# Patient Record
Sex: FEMALE | Race: WHITE | NOT HISPANIC OR LATINO
[De-identification: names, ages, dates, MRNs, and addresses within clinical notes are randomized per-mention and may not be internally consistent; named-entity substitution may affect disease eponyms.]

---

## 2022-10-17 PROBLEM — Z00.00 ENCOUNTER FOR PREVENTIVE HEALTH EXAMINATION: Status: ACTIVE | Noted: 2022-10-17

## 2022-11-22 ENCOUNTER — APPOINTMENT (OUTPATIENT)
Dept: OBGYN | Facility: CLINIC | Age: 77
End: 2022-11-22

## 2023-03-28 ENCOUNTER — APPOINTMENT (OUTPATIENT)
Dept: ENDOCRINOLOGY | Facility: CLINIC | Age: 78
End: 2023-03-28
Payer: MEDICARE

## 2023-03-28 ENCOUNTER — RESULT REVIEW (OUTPATIENT)
Age: 78
End: 2023-03-28

## 2023-03-28 VITALS
HEART RATE: 70 BPM | OXYGEN SATURATION: 98 % | HEIGHT: 60 IN | SYSTOLIC BLOOD PRESSURE: 100 MMHG | BODY MASS INDEX: 20.22 KG/M2 | WEIGHT: 103 LBS | DIASTOLIC BLOOD PRESSURE: 58 MMHG

## 2023-03-28 DIAGNOSIS — E53.8 DEFICIENCY OF OTHER SPECIFIED B GROUP VITAMINS: ICD-10-CM

## 2023-03-28 DIAGNOSIS — E55.9 VITAMIN D DEFICIENCY, UNSPECIFIED: ICD-10-CM

## 2023-03-28 DIAGNOSIS — E34.9 ENDOCRINE DISORDER, UNSPECIFIED: ICD-10-CM

## 2023-03-28 DIAGNOSIS — R79.89 OTHER SPECIFIED ABNORMAL FINDINGS OF BLOOD CHEMISTRY: ICD-10-CM

## 2023-03-28 PROCEDURE — 36415 COLL VENOUS BLD VENIPUNCTURE: CPT

## 2023-03-28 PROCEDURE — 99203 OFFICE O/P NEW LOW 30 MIN: CPT | Mod: 25

## 2023-03-29 LAB
25(OH)D3 SERPL-MCNC: 70.7 NG/ML
ANION GAP SERPL CALC-SCNC: 11 MMOL/L
BASOPHILS # BLD AUTO: 0.05 K/UL
BASOPHILS NFR BLD AUTO: 0.8 %
BUN SERPL-MCNC: 45 MG/DL
CALCIUM SERPL-MCNC: 9.9 MG/DL
CALCIUM SERPL-MCNC: 9.9 MG/DL
CHLORIDE SERPL-SCNC: 104 MMOL/L
CO2 SERPL-SCNC: 25 MMOL/L
CREAT SERPL-MCNC: 0.85 MG/DL
EGFR: 71 ML/MIN/1.73M2
EOSINOPHIL # BLD AUTO: 0.01 K/UL
EOSINOPHIL NFR BLD AUTO: 0.2 %
GLUCOSE SERPL-MCNC: 91 MG/DL
HCT VFR BLD CALC: 35.2 %
HGB BLD-MCNC: 11.1 G/DL
IMM GRANULOCYTES NFR BLD AUTO: 0.3 %
LYMPHOCYTES # BLD AUTO: 2.06 K/UL
LYMPHOCYTES NFR BLD AUTO: 33.7 %
MAN DIFF?: NORMAL
MCHC RBC-ENTMCNC: 30.7 PG
MCHC RBC-ENTMCNC: 31.5 GM/DL
MCV RBC AUTO: 97.5 FL
MONOCYTES # BLD AUTO: 0.55 K/UL
MONOCYTES NFR BLD AUTO: 9 %
NEUTROPHILS # BLD AUTO: 3.43 K/UL
NEUTROPHILS NFR BLD AUTO: 56 %
PARATHYROID HORMONE INTACT: 17 PG/ML
PHOSPHATE SERPL-MCNC: 3.7 MG/DL
PLATELET # BLD AUTO: 238 K/UL
POTASSIUM SERPL-SCNC: 4.8 MMOL/L
RBC # BLD: 3.61 M/UL
RBC # FLD: 14.9 %
SODIUM SERPL-SCNC: 140 MMOL/L
T4 SERPL-MCNC: 7.5 UG/DL
TSH SERPL-ACNC: 2.67 UIU/ML
VIT B12 SERPL-MCNC: 1124 PG/ML
WBC # FLD AUTO: 6.12 K/UL

## 2023-03-31 LAB
ALBUMIN MFR SERPL ELPH: 60.5 %
ALBUMIN SERPL-MCNC: 4.2 G/DL
ALBUMIN/GLOB SERPL: 1.5 RATIO
ALPHA1 GLOB MFR SERPL ELPH: 3.8 %
ALPHA1 GLOB SERPL ELPH-MCNC: 0.3 G/DL
ALPHA2 GLOB MFR SERPL ELPH: 10 %
ALPHA2 GLOB SERPL ELPH-MCNC: 0.7 G/DL
B-GLOBULIN MFR SERPL ELPH: 13.1 %
B-GLOBULIN SERPL ELPH-MCNC: 0.9 G/DL
CA-I SERPL-SCNC: 5.2 MG/DL
GAMMA GLOB FLD ELPH-MCNC: 0.9 G/DL
GAMMA GLOB MFR SERPL ELPH: 12.6 %
GLIADIN IGA SER QL: 5.3 UNITS
GLIADIN IGG SER QL: <5 UNITS
GLIADIN PEPTIDE IGA SER-ACNC: NEGATIVE
GLIADIN PEPTIDE IGG SER-ACNC: NEGATIVE
INTERPRETATION SERPL IEP-IMP: NORMAL
M PROTEIN SPEC IFE-MCNC: NORMAL
PROT SERPL-MCNC: 7 G/DL
PROT SERPL-MCNC: 7 G/DL
TTG IGA SER IA-ACNC: <1.2 U/ML
TTG IGA SER-ACNC: NEGATIVE
TTG IGG SER IA-ACNC: <1.2 U/ML
TTG IGG SER IA-ACNC: NEGATIVE

## 2023-04-02 LAB — OSTEOCALCIN SERPL-MCNC: 12.9 NG/ML

## 2023-04-05 LAB — COLLAGEN CTX SERPL-MCNC: 239 PG/ML

## 2023-04-18 NOTE — HISTORY OF PRESENT ILLNESS
[FreeTextEntry1] : Mar 28, 2023\par \par Derm:  Dr. Lorna Pandya at [address]\par             Dr. Katy Ruiz in [place]\par            Derm:  Dr. Brand at [facility]\par            Eyes:   Dr. Garza  \par \par CC:  Bone density:   T scores:  LS -2.3  (L4 -2.6)     TH -2.9;  FN -2.5 (ordered by Marco Cristina  10/20/22)\par \par 79 yo with osteoporosis based on bone density.\par No history of fractures.\par Smoker.\par Little alcohol.\par Mother broke her leg walking the dog\par Takes vitamin D 1000 iu daily plus\par calcium 200 mg + 500 iu vitamin D.\par \par Two falls, no fractures.  \par \par \par Labs at [facility] 2/10/16 unrmearkable \par \par Bone density at [facility] 10/20/22  T scores:  LS -2.3  (L4 -2.6)     TH -2.9;  FN -2.5\par \par \par : :\par Constitutional:  Alert, well nourished, healthy appearance, no acute distress \par Eyes:  No proptosis, no stare\par Thyroid:\par Pulmonary:  No respiratory distress, no accessory muscle use; normal rate and effort\par Cardiac:  Normal rate\par Vascular: \par Endocrine:  No stigmata of Cushing’s Syndrome\par Musculoskeletal:  Normal gait, no involuntary movements\par Neurology:  No tremors\par Affect/Mood/Psych:  Oriented x 3; normal affect, normal insight/judgement, normal mood \par .\par \par Impression:    T scores at or below -2.5 indicate further evaluation to be considered priot to pharmacologic considerations.

## 2023-04-28 ENCOUNTER — APPOINTMENT (OUTPATIENT)
Dept: ENDOCRINOLOGY | Facility: CLINIC | Age: 78
End: 2023-04-28
Payer: MEDICARE

## 2023-04-28 VITALS
DIASTOLIC BLOOD PRESSURE: 78 MMHG | WEIGHT: 103 LBS | HEART RATE: 60 BPM | SYSTOLIC BLOOD PRESSURE: 106 MMHG | HEIGHT: 60 IN | OXYGEN SATURATION: 98 % | BODY MASS INDEX: 20.22 KG/M2

## 2023-04-28 PROCEDURE — 99214 OFFICE O/P EST MOD 30 MIN: CPT

## 2023-05-02 NOTE — HISTORY OF PRESENT ILLNESS
[FreeTextEntry1] : Apr 28, 2023    in person\par \par Derm:  Dr. Lorna Pandya at [address]\par             Dr. Katy Ruiz in [place]\par            Derm:  Dr. Brand at [facility]\par            Eyes:   Dr. Garza  \par \par CC:  Bone density:   T scores:  LS -2.3  (L4 -2.6*)     TH -2.9*;  FN -2.5 (ordered by Marco Cristina  10/20/22)\par             -BMI  20.12  5 ft 103 lbs\par \par 79 yo with osteoporosis based on bone density.\par No history of fractures.\par Ex-smoker.  \par Little alcohol.\par Mother broke her leg walking the dog\par Takes vitamin D 1000 iu daily plus\par calcium 200 mg + 500 iu vitamin D.\par \par Two falls, no fractures.  \par \par : :\par Constitutional:  Alert, well nourished, healthy appearance, no acute distress \par Eyes:  No proptosis, no stare\par Thyroid:\par Pulmonary:  No respiratory distress, no accessory muscle use; normal rate and effort\par Cardiac:  Normal rate\par Vascular: \par Endocrine:  No stigmata of Cushing’s Syndrome\par Musculoskeletal:  Normal gait, no involuntary movements\par Neurology:  No tremors\par Affect/Mood/Psych:  Oriented x 3; normal affect, normal insight/judgement, normal mood \par .\par \par Impression:  TH T score of -2.9 is significant.    \par \par Plan:  Inc calcium 200 mg to BID w/ meal\par She is interested in Prolia.\par It would  be helpful for bone density if she could increase BMI as well.       \par \par \par Mar 28, 2023\par \par Derm:  Dr. Lorna Pandya at [address]\par             Dr. Katy Ruiz in [place]\par            Derm:  Dr. Brand at [facility]\par            Eyes:   Dr. Garza  \par \par CC:  Bone density:   T scores:  LS -2.3  (L4 -2.6)     TH -2.9;  FN -2.5 (ordered by Marco Cristina  10/20/22)\par \par 79 yo with osteoporosis based on bone density.\par No history of fractures.\par Smoker.\par Little alcohol.\par Mother broke her leg walking the dog\par Takes vitamin D 1000 iu daily plus\par calcium 200 mg + 500 iu vitamin D.\par \par Two falls, no fractures.  \par \par \par Labs at [facility] 2/10/16 unrmearkable \par \par Bone density at [facility] 10/20/22  T scores:  LS -2.3  (L4 -2.6)     TH -2.9;  FN -2.5\par \par \par : :\par Constitutional:  Alert, well nourished, healthy appearance, no acute distress \par Eyes:  No proptosis, no stare\par Thyroid:\par Pulmonary:  No respiratory distress, no accessory muscle use; normal rate and effort\par Cardiac:  Normal rate\par Vascular: \par Endocrine:  No stigmata of Cushing’s Syndrome\par Musculoskeletal:  Normal gait, no involuntary movements\par Neurology:  No tremors\par Affect/Mood/Psych:  Oriented x 3; normal affect, normal insight/judgement, normal mood \par .\par \par Impression:    T scores at or below -2.5 indicate further evaluation to be considered priot to pharmacologic considerations.

## 2023-05-12 ENCOUNTER — TRANSCRIPTION ENCOUNTER (OUTPATIENT)
Age: 78
End: 2023-05-12

## 2023-07-20 ENCOUNTER — APPOINTMENT (OUTPATIENT)
Dept: PAIN MANAGEMENT | Facility: CLINIC | Age: 78
End: 2023-07-20
Payer: MEDICARE

## 2023-07-20 ENCOUNTER — RESULT REVIEW (OUTPATIENT)
Age: 78
End: 2023-07-20

## 2023-07-20 VITALS
HEART RATE: 73 BPM | OXYGEN SATURATION: 98 % | DIASTOLIC BLOOD PRESSURE: 78 MMHG | RESPIRATION RATE: 18 BRPM | HEIGHT: 60 IN | SYSTOLIC BLOOD PRESSURE: 123 MMHG | WEIGHT: 104 LBS | BODY MASS INDEX: 20.42 KG/M2

## 2023-07-20 DIAGNOSIS — F31.70 BIPOLAR DISORDER, CURRENTLY IN REMISSION, MOST RECENT EPISODE UNSPECIFIED: ICD-10-CM

## 2023-07-20 DIAGNOSIS — M17.12 UNILATERAL PRIMARY OSTEOARTHRITIS, LEFT KNEE: ICD-10-CM

## 2023-07-20 DIAGNOSIS — Z85.51 PERSONAL HISTORY OF MALIGNANT NEOPLASM OF BLADDER: ICD-10-CM

## 2023-07-20 DIAGNOSIS — Z78.9 OTHER SPECIFIED HEALTH STATUS: ICD-10-CM

## 2023-07-20 DIAGNOSIS — Z87.39 PERSONAL HISTORY OF OTHER DISEASES OF THE MUSCULOSKELETAL SYSTEM AND CONNECTIVE TISSUE: ICD-10-CM

## 2023-07-20 DIAGNOSIS — S89.92XS UNSPECIFIED INJURY OF LEFT LOWER LEG, SEQUELA: ICD-10-CM

## 2023-07-20 PROCEDURE — 99203 OFFICE O/P NEW LOW 30 MIN: CPT

## 2023-07-20 RX ORDER — BIOTIN 10 MG
TABLET ORAL
Refills: 0 | Status: ACTIVE | COMMUNITY

## 2023-07-20 RX ORDER — UBIDECARENONE/VIT E ACET 100MG-5
CAPSULE ORAL
Refills: 0 | Status: ACTIVE | COMMUNITY

## 2023-07-20 NOTE — PHYSICAL EXAM

## 2023-07-20 NOTE — ASSESSMENT
[FreeTextEntry1] : 78 yof w/ left knee clicking.\par \par Xrays of left knee.\par \par Physical therapy prescribed - goal will be to increase ROM, strengthening, postural training, other modalities ad massimo which may include massage and stim. Goals of therapy discussed with the patient in detail and will be discussed with physical therapist. Patient will follow-up following course of physical therapy to monitor progress and adjust therapy as needed.\par \par Acetaminophen 1,000 mg q8h prn for moderate pain. Risks, benefits, and alternatives of acetaminophen discussed with patient.\par \par Ibuprofen 600 mg q8h prn add when pain is not adequately controlled with acetaminophen. Risks, benefits, and alternatives of ibuprofen discussed with patient.\par \par Diet and nutritional strategies discussed which may improve patients pain and will improve overall health.\par \par RTC prn.

## 2023-07-20 NOTE — HISTORY OF PRESENT ILLNESS
[Joint Pain] : joint  [Constant] : constant [1] : a minimum pain level of 1/10 [6] : a maximum pain level of 6/10 [Dull] : dull [Aching] : aching [Standing] : standing [Walking] : walking [Rest] : rest [FreeTextEntry1] : 78 yof presents w/ left knee clicking. Pain began after she fell in front of [employer] on 12/30/20. She reports suing after this successfully. She then fell again in May of 2023 in [facility]. She reports clicking and cracking in her knee, no pain. Mild balance issues. Quality of life is impaired. There has been a severe exacerbation of the patient's chronic pain. PT has helped her before.

## 2023-09-24 ENCOUNTER — TRANSCRIPTION ENCOUNTER (OUTPATIENT)
Age: 78
End: 2023-09-24

## 2023-10-03 ENCOUNTER — TRANSCRIPTION ENCOUNTER (OUTPATIENT)
Age: 78
End: 2023-10-03

## 2023-10-11 ENCOUNTER — TRANSCRIPTION ENCOUNTER (OUTPATIENT)
Age: 78
End: 2023-10-11

## 2023-10-11 ENCOUNTER — APPOINTMENT (OUTPATIENT)
Dept: ENDOCRINOLOGY | Facility: CLINIC | Age: 78
End: 2023-10-11
Payer: MEDICARE

## 2023-10-11 PROCEDURE — 99214 OFFICE O/P EST MOD 30 MIN: CPT | Mod: 95

## 2023-10-12 ENCOUNTER — TRANSCRIPTION ENCOUNTER (OUTPATIENT)
Age: 78
End: 2023-10-12

## 2023-10-13 ENCOUNTER — APPOINTMENT (OUTPATIENT)
Dept: GERIATRICS | Facility: CLINIC | Age: 78
End: 2023-10-13
Payer: MEDICARE

## 2023-10-13 VITALS
DIASTOLIC BLOOD PRESSURE: 60 MMHG | TEMPERATURE: 97.8 F | OXYGEN SATURATION: 97 % | HEART RATE: 73 BPM | SYSTOLIC BLOOD PRESSURE: 116 MMHG | WEIGHT: 107.6 LBS | BODY MASS INDEX: 21.01 KG/M2

## 2023-10-13 DIAGNOSIS — Z13.220 ENCOUNTER FOR SCREENING FOR LIPOID DISORDERS: ICD-10-CM

## 2023-10-13 DIAGNOSIS — Z23 ENCOUNTER FOR IMMUNIZATION: ICD-10-CM

## 2023-10-13 DIAGNOSIS — Z71.89 OTHER SPECIFIED COUNSELING: ICD-10-CM

## 2023-10-13 DIAGNOSIS — Z11.59 ENCOUNTER FOR SCREENING FOR OTHER VIRAL DISEASES: ICD-10-CM

## 2023-10-13 DIAGNOSIS — L98.9 DISORDER OF THE SKIN AND SUBCUTANEOUS TISSUE, UNSPECIFIED: ICD-10-CM

## 2023-10-13 PROCEDURE — 99205 OFFICE O/P NEW HI 60 MIN: CPT

## 2023-10-13 PROCEDURE — G0444 DEPRESSION SCREEN ANNUAL: CPT | Mod: 59

## 2023-10-13 RX ORDER — DENOSUMAB 60 MG/ML
60 INJECTION SUBCUTANEOUS
Refills: 0 | Status: ACTIVE | COMMUNITY
Start: 2023-10-13

## 2023-10-16 DIAGNOSIS — Z91.89 OTHER SPECIFIED PERSONAL RISK FACTORS, NOT ELSEWHERE CLASSIFIED: ICD-10-CM

## 2023-10-16 LAB
CHOLEST SERPL-MCNC: 234 MG/DL
HCV AB SER QL: NONREACTIVE
HCV S/CO RATIO: 0.08 S/CO
HDLC SERPL-MCNC: 89 MG/DL
LDLC SERPL CALC-MCNC: 137 MG/DL
NONHDLC SERPL-MCNC: 145 MG/DL
TRIGL SERPL-MCNC: 46 MG/DL

## 2023-10-21 ENCOUNTER — RESULT REVIEW (OUTPATIENT)
Age: 78
End: 2023-10-21

## 2023-11-01 ENCOUNTER — APPOINTMENT (OUTPATIENT)
Dept: OBGYN | Facility: CLINIC | Age: 78
End: 2023-11-01

## 2023-11-02 ENCOUNTER — APPOINTMENT (OUTPATIENT)
Dept: OBGYN | Facility: CLINIC | Age: 78
End: 2023-11-02

## 2023-11-07 ENCOUNTER — NON-APPOINTMENT (OUTPATIENT)
Age: 78
End: 2023-11-07

## 2023-11-07 ENCOUNTER — APPOINTMENT (OUTPATIENT)
Dept: OBGYN | Facility: CLINIC | Age: 78
End: 2023-11-07
Payer: MEDICARE

## 2023-11-07 ENCOUNTER — RESULT REVIEW (OUTPATIENT)
Age: 78
End: 2023-11-07

## 2023-11-07 VITALS
DIASTOLIC BLOOD PRESSURE: 60 MMHG | SYSTOLIC BLOOD PRESSURE: 118 MMHG | WEIGHT: 103 LBS | HEIGHT: 60 IN | BODY MASS INDEX: 20.22 KG/M2

## 2023-11-07 DIAGNOSIS — Z01.419 ENCOUNTER FOR GYNECOLOGICAL EXAMINATION (GENERAL) (ROUTINE) W/OUT ABNORMAL FINDINGS: ICD-10-CM

## 2023-11-07 PROCEDURE — G0101: CPT

## 2023-11-13 ENCOUNTER — NON-APPOINTMENT (OUTPATIENT)
Age: 78
End: 2023-11-13

## 2023-11-13 ENCOUNTER — APPOINTMENT (OUTPATIENT)
Dept: CARDIOLOGY | Facility: CLINIC | Age: 78
End: 2023-11-13
Payer: MEDICARE

## 2023-11-13 VITALS
BODY MASS INDEX: 20.12 KG/M2 | RESPIRATION RATE: 14 BRPM | SYSTOLIC BLOOD PRESSURE: 120 MMHG | DIASTOLIC BLOOD PRESSURE: 76 MMHG | WEIGHT: 103 LBS | HEART RATE: 70 BPM | OXYGEN SATURATION: 97 %

## 2023-11-13 DIAGNOSIS — E78.2 MIXED HYPERLIPIDEMIA: ICD-10-CM

## 2023-11-13 PROCEDURE — 99204 OFFICE O/P NEW MOD 45 MIN: CPT | Mod: 25

## 2023-11-13 PROCEDURE — 93000 ELECTROCARDIOGRAM COMPLETE: CPT

## 2023-11-13 RX ORDER — DIVALPROEX SODIUM 500 1/1
500 TABLET, EXTENDED RELEASE ORAL
Refills: 0 | Status: ACTIVE | COMMUNITY

## 2023-11-14 ENCOUNTER — NON-APPOINTMENT (OUTPATIENT)
Age: 78
End: 2023-11-14

## 2023-11-27 ENCOUNTER — LABORATORY RESULT (OUTPATIENT)
Age: 78
End: 2023-11-27

## 2023-11-28 LAB
CYTOLOGY CVX/VAG DOC THIN PREP: ABNORMAL
HPV HIGH+LOW RISK DNA PNL CVX: NOT DETECTED

## 2024-01-19 ENCOUNTER — APPOINTMENT (OUTPATIENT)
Dept: GERIATRICS | Facility: CLINIC | Age: 79
End: 2024-01-19
Payer: MEDICARE

## 2024-01-19 PROCEDURE — 36415 COLL VENOUS BLD VENIPUNCTURE: CPT

## 2024-01-22 LAB
CHOLEST SERPL-MCNC: 173 MG/DL
HDLC SERPL-MCNC: 88 MG/DL
LDLC SERPL CALC-MCNC: 76 MG/DL
NONHDLC SERPL-MCNC: 85 MG/DL
TRIGL SERPL-MCNC: 42 MG/DL

## 2024-01-31 ENCOUNTER — APPOINTMENT (OUTPATIENT)
Dept: GERIATRICS | Facility: CLINIC | Age: 79
End: 2024-01-31
Payer: MEDICARE

## 2024-01-31 VITALS
HEART RATE: 78 BPM | SYSTOLIC BLOOD PRESSURE: 114 MMHG | BODY MASS INDEX: 20.39 KG/M2 | DIASTOLIC BLOOD PRESSURE: 64 MMHG | WEIGHT: 104.4 LBS | TEMPERATURE: 98.1 F | OXYGEN SATURATION: 98 %

## 2024-01-31 PROCEDURE — 99497 ADVNCD CARE PLAN 30 MIN: CPT

## 2024-01-31 NOTE — GOALS
[Patient] : patient [Healthcare proxy document is in chart] : Healthcare proxy document is in chart [DNR/Trial Intubation] : DNR/Trial Intubation [MOLST Completed] : MOLST Completed [Time Spent: ___ minutes] : I, personally, spent [unfilled] minutes on advance care planning services with the patient.  This time is separate and distinct from any other care management services provided on this date [FreeTextEntry1] : Ina Martinez [FreeTextEntry2] :  Sister in law [FreeTextEntry6] : kerrie Amaral [FreeTextEntry5] : best friend [FreeTextEntry7] :  Today  IRAM would like to discuss advance care planning.  We discussed the patient's desire for care and treatment if she were to face an end-of-life illness or an illness that affects her decision-making capabilities.  We also discussed what measures the patient would like to undergo if her heart were to stop beating and/or she is not able to breathe on her own. What decisions were made?  Today we filled out MOLST form.  Patient would not want to be resuscitated once her heart stops beating.  She would rather pass away peacefully and naturally.  She would want intubation if there is reversible cause for short time Only.  She would not want to be dialyzed.  She is okay with use of antibiotics, feeding tube and IV fluid for short-term case-by-case basis. [LastACPVerDate] : 01/31/2024

## 2024-04-09 RX ORDER — ATORVASTATIN CALCIUM 40 MG/1
40 TABLET, FILM COATED ORAL
Qty: 90 | Refills: 3 | Status: ACTIVE | COMMUNITY
Start: 2023-11-07 | End: 1900-01-01

## 2024-04-23 ENCOUNTER — APPOINTMENT (OUTPATIENT)
Dept: DERMATOLOGY | Facility: CLINIC | Age: 79
End: 2024-04-23

## 2024-05-02 ENCOUNTER — NON-APPOINTMENT (OUTPATIENT)
Age: 79
End: 2024-05-02

## 2024-05-12 ENCOUNTER — NON-APPOINTMENT (OUTPATIENT)
Age: 79
End: 2024-05-12

## 2024-05-21 ENCOUNTER — APPOINTMENT (OUTPATIENT)
Dept: ENDOCRINOLOGY | Facility: CLINIC | Age: 79
End: 2024-05-21
Payer: MEDICARE

## 2024-05-21 VITALS
DIASTOLIC BLOOD PRESSURE: 70 MMHG | BODY MASS INDEX: 20.22 KG/M2 | HEART RATE: 84 BPM | WEIGHT: 103 LBS | HEIGHT: 60 IN | OXYGEN SATURATION: 98 % | SYSTOLIC BLOOD PRESSURE: 112 MMHG

## 2024-05-21 DIAGNOSIS — M81.8 OTHER OSTEOPOROSIS W/OUT CURRENT PATHOLOGICAL FRACTURE: ICD-10-CM

## 2024-05-21 PROCEDURE — 99215 OFFICE O/P EST HI 40 MIN: CPT

## 2024-05-24 PROBLEM — M81.8 OSTEOPOROSIS, IDIOPATHIC: Status: ACTIVE | Noted: 2023-03-28

## 2024-05-24 NOTE — HISTORY OF PRESENT ILLNESS
[FreeTextEntry1] : May 21, 2024    in person    had recent Covid infection                    Dr. Jeffry Cosby           on citrical w/ D BID        Did rehab.                              Fell Sept 17 -> [place]  home yesterday from rehab facility                                     Received Prolia #1 in Spring.      Needs next Prolia.     Derm: Dr. Lorna Pandya at [address]   Dr. Katy Ruiz in [place]   Derm: Dr. Brand at [facility]   Eyes: Dr. Garza    CC: Bone density: T scores: LS -2.3 (L4 -2.6*) TH -2.9*; FN -2.5 (ordered by Marco Cristina 10/20/22)   -BMI 20.12 5 ft 103 lbs    80 yo with osteoporosis based on bone density.  No history of fractures.  Ex-smoker.  Little alcohol.  Mother broke her leg walking the dog  Takes vitamin D 1000 iu daily plus  calcium 200 mg + 500 iu vitamin D.    Two falls, no fractures. Here 5/21/24 to plan Prolia injection which was delayed by Covid infection. Feeling all better.  Plan:  Updated  vit D   Oct 11, 2023      telephonic       needs [place]      next Tues   *****************                          Dr. Jeffry Cosby           on citrical w/ D BID        Did rehab.                              Fell Sept 17 -> [place]  home yesterday from rehab facility                                     Received Prolia #1 in Spring.      Needs next Prolia.     Derm: Dr. Lorna Pandya at [address]   Dr. Katy Ruiz in [place]   Derm: Dr. Brand at [facility]   Eyes: Dr. Garza    CC: Bone density: T scores: LS -2.3 (L4 -2.6*) TH -2.9*; FN -2.5 (ordered by Marco Cristina 10/20/22)   -BMI 20.12 5 ft 103 lbs    77 yo with osteoporosis based on bone density.  No history of fractures.  Ex-smoker.  Little alcohol.  Mother broke her leg walking the dog  Takes vitamin D 1000 iu daily plus  calcium 200 mg + 500 iu vitamin D.    Two falls, no fractures.  Impression:  Fortunately no fractures. Plan:   Discussed Prolia, risks, benefits, alternative.s.

## 2024-05-31 LAB
25(OH)D3 SERPL-MCNC: 64 NG/ML
ALBUMIN SERPL ELPH-MCNC: 4.4 G/DL
ALP BLD-CCNC: 93 U/L
ALT SERPL-CCNC: 32 U/L
ANION GAP SERPL CALC-SCNC: 13 MMOL/L
AST SERPL-CCNC: 32 U/L
BASOPHILS # BLD AUTO: 0.04 K/UL
BASOPHILS NFR BLD AUTO: 0.5 %
BILIRUB DIRECT SERPL-MCNC: 0.1 MG/DL
BILIRUB INDIRECT SERPL-MCNC: 0.2 MG/DL
BILIRUB SERPL-MCNC: 0.3 MG/DL
BUN SERPL-MCNC: 28 MG/DL
CALCIUM SERPL-MCNC: 10.4 MG/DL
CHLORIDE SERPL-SCNC: 100 MMOL/L
CO2 SERPL-SCNC: 25 MMOL/L
COLLAGEN CTX SERPL-MCNC: 58 PG/ML
CREAT SERPL-MCNC: 0.83 MG/DL
EGFR: 72 ML/MIN/1.73M2
EOSINOPHIL # BLD AUTO: 0.05 K/UL
EOSINOPHIL NFR BLD AUTO: 0.6 %
GLUCOSE SERPL-MCNC: 90 MG/DL
HCT VFR BLD CALC: 35.7 %
HGB BLD-MCNC: 11.4 G/DL
IMM GRANULOCYTES NFR BLD AUTO: 0.5 %
LYMPHOCYTES # BLD AUTO: 3.31 K/UL
LYMPHOCYTES NFR BLD AUTO: 39.8 %
MAN DIFF?: NORMAL
MCHC RBC-ENTMCNC: 30.5 PG
MCHC RBC-ENTMCNC: 31.9 GM/DL
MCV RBC AUTO: 95.5 FL
MONOCYTES # BLD AUTO: 0.87 K/UL
MONOCYTES NFR BLD AUTO: 10.5 %
NEUTROPHILS # BLD AUTO: 4 K/UL
NEUTROPHILS NFR BLD AUTO: 48.1 %
OSTEOCALCIN SERPL-MCNC: 6.7 NG/ML
PLATELET # BLD AUTO: 324 K/UL
POTASSIUM SERPL-SCNC: 5 MMOL/L
PROT SERPL-MCNC: 7.3 G/DL
RBC # BLD: 3.74 M/UL
RBC # FLD: 15.7 %
SODIUM SERPL-SCNC: 139 MMOL/L
WBC # FLD AUTO: 8.31 K/UL

## 2024-06-04 ENCOUNTER — APPOINTMENT (OUTPATIENT)
Dept: GERIATRICS | Facility: CLINIC | Age: 79
End: 2024-06-04

## 2024-06-14 ENCOUNTER — APPOINTMENT (OUTPATIENT)
Dept: GERIATRICS | Facility: CLINIC | Age: 79
End: 2024-06-14
Payer: MEDICARE

## 2024-06-14 VITALS
DIASTOLIC BLOOD PRESSURE: 70 MMHG | WEIGHT: 109 LBS | TEMPERATURE: 97.5 F | SYSTOLIC BLOOD PRESSURE: 112 MMHG | HEART RATE: 80 BPM | BODY MASS INDEX: 21.29 KG/M2 | OXYGEN SATURATION: 97 %

## 2024-06-14 DIAGNOSIS — I25.10 ATHEROSCLEROTIC HEART DISEASE OF NATIVE CORONARY ARTERY W/OUT ANGINA PECTORIS: ICD-10-CM
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
## 2024-06-14 DIAGNOSIS — F31.60 BIPOLAR DISORDER, CURRENT EPISODE MIXED, UNSPECIFIED: ICD-10-CM

## 2024-06-14 DIAGNOSIS — I49.9 CARDIAC ARRHYTHMIA, UNSPECIFIED: ICD-10-CM

## 2024-06-14 DIAGNOSIS — F43.21 ADJUSTMENT DISORDER WITH DEPRESSED MOOD: ICD-10-CM

## 2024-06-14 PROCEDURE — 99214 OFFICE O/P EST MOD 30 MIN: CPT

## 2024-06-14 PROCEDURE — G2211 COMPLEX E/M VISIT ADD ON: CPT

## 2024-06-14 NOTE — HISTORY OF PRESENT ILLNESS
[Patient reported osteoporosis screening was abnormal] : Patient reported osteoporosis screening was abnormal [Patient reported hearing was abnormal] : Patient reported hearing was abnormal [Patient reported vision is abnormal] : Patient reported vision is abnormal [Patient reported colon/rectal/cancer screening was normal] : Patient reported colon/rectal cancer screening was normal [Patient reported skin cancer screening was normal] : Patient reported skin cancer screening was normal [Patient reported breast cancer screening was normal] : Patient reported breast cancer screening was normal [Two or more falls in past year] : Patient reported two or more falls in the past year [Patient is independent with] : bathing [Independent] : managing finances [] : Assistance needed with traveling/transport [Cane] : cane [Walker] : walker [Driving Concerns] : driving concerns noted [0] : 1) Little interest or pleasure doing things: Not at all (0) [1] : 2) Feeling down, depressed, or hopeless for several days (1) [PHQ-2 Negative - No further assessment needed] : PHQ-2 Negative - No further assessment needed [I have developed a follow-up plan documented below in the note.] : I have developed a follow-up plan documented below in the note. [FreeTextEntry1] : IRAM YOUNG is a 78-year-old White female, is coming in today to establish care. The patient has PMHx of vitamin D deficiency and osteoporosis.    #Osteoporosis Followed by endocrinology On Prolia, calcium, vitamin D  #Hyperlipidemia Started on Lipitor 40 mg nightly, LDL well-controlled  #CAD as per calcium scoring On aspirin 81 Followed by cardiology  #Possible A-fib Monitored by cardiology  #Bipolar disorder on divalproex since long time. has a psychiatrist.  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   Hospital course is complicated with virus superimposed infection, start IV antibiotics, patient feels better after iv antibiotics.   Patient denies fever, chill, headache, dizziness, hest pain, SOB, abdominal pain, N.V. No diarrhea,no urinary complaints.   CK level trending down. Patient is medically stable to be discharged today.   Discussed with Family.      PT from next week. Out from SNF since tuesday 10 october. Using walker since the fall and chair for shower. has grief counseling every week. Has good [place] of friends. Has activity tomorrow for breast care awareness. Grief regarding death of sister last year and separation from   #Osteoporosis On Prolia, vitamin D, calcium.  Follows endocrinology     Education: Bachelor's degree in business/ HR management Work: retired ; HR ETOH/Smokin glass wine occasionaly, no smoking Weight loss/malnutrition: no : , no kids has living will;     4M Geriatric Screening Tests   Based on The 4Ms While Caring for Older Adults, an evidence-based focus on the key areas of mentation, mobility, medications, and what matters most (a guide by the [place] for Healthcare Improvement and the [employer])     Medications: -Anticholinergic burden: no    Mobility:   -Driving- yes; but not driving until feel more confident -Chronic pain: no    Frail Scale: 0/5   -Are you fatigued? improving -Can you walk up one flight of stairs? yes -Can you walk one block? yes -Do you have more than 5 illnesses? no -Have you lost more than 5% of your weight in last 6 months?no   Mentation: -Hx of dementia: no -h/o delirium: no -Cognitive enhancing agents: no   Sleep: well     Matters Most: Will discuss next visit   [TextBox_31] : wears hearing aids [TextBox_37] : had cataract surgery; now only reading glasses [TextBox_19] : always clean, did cologuard last time.  [FreeTextEntry4] : next mammogram 10/21/2023 [de-identified] : tripped in [place]; 2nd time fell in apartment; tripped and fell off high stool;  [FreeTextEntry3] : using walker all the time after fall; before that was using cane [de-identified] : her bryant rfrimathieu  last week. had dementia [[id number]] : 1

## 2024-07-25 ENCOUNTER — APPOINTMENT (OUTPATIENT)
Dept: GERIATRICS | Facility: CLINIC | Age: 79
End: 2024-07-25
Payer: MEDICARE

## 2024-07-25 DIAGNOSIS — I49.9 CARDIAC ARRHYTHMIA, UNSPECIFIED: ICD-10-CM

## 2024-07-25 DIAGNOSIS — R53.83 OTHER FATIGUE: ICD-10-CM

## 2024-07-25 PROCEDURE — 99443: CPT | Mod: 93

## 2024-07-25 NOTE — REASON FOR VISIT
[Home] : at home, [unfilled] , at the time of the visit. [Medical Office: ([facility])___] : at the medical office located in  [Verbal consent obtained from patient] : the patient, [unfilled] [Follow-Up] : a follow-up visit

## 2024-07-25 NOTE — HISTORY OF PRESENT ILLNESS
[FreeTextEntry1] : Patient is a 78-year-old female with past medical history of hyperlipidemia, osteoporosis, moderate CAD per calcium scoring, possible A-fib followed by cardiology, bipolar disorder on divalproex.  Today patient would like to talk about her Lipitor.  After she fell in  she was sent to a SNF and got home physical therapy and Occupational Therapy after which she moved to a new apartment which exhausted her.  Patient states that she has not felt the same since then.  She was started on Lipitor in November and one of her friends said that she had a bad reaction from Lipitor which made her tired and this was switched to rosuvastatin.  Patient would like to know if she can be switched to rosuvastatin as well.  She has no muscle pains.  She is getting occupational therapy for balance.   2024 IRAM YOUNG is a 78-year-old White female, is coming in today to establish care. The patient has PMHx of vitamin D deficiency and osteoporosis.    #Osteoporosis Followed by endocrinology On Prolia, calcium, vitamin D  #Hyperlipidemia Started on Lipitor 40 mg nightly, LDL well-controlled  #CAD as per calcium scoring On aspirin 81 Followed by cardiology  #Possible A-fib Monitored by cardiology  #Bipolar disorder on divalproex since long time. has a psychiatrist.  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   Hospital course is complicated with virus superimposed infection, start IV antibiotics, patient feels better after iv antibiotics.   Patient denies fever, chill, headache, dizziness, hest pain, SOB, abdominal pain, N.V. No diarrhea,no urinary complaints.   CK level trending down. Patient is medically stable to be discharged today.   Discussed with Family.      PT from next week. Out from SNF since tuesday 10 october. Using walker since the fall and chair for shower. has grief counseling every week. Has good [place] of friends. Has activity tomorrow for breast care awareness. Grief regarding death of sister last year and separation from   #Osteoporosis On Prolia, vitamin D, calcium.  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)     Medications: -Anticholinergic burden: no    Mobility:   -Driving- yes; but not driving until feel more confident -Chronic pain: no    Frail Scale: 0/5   -Are you fatigued? improving -Can you walk up one flight of stairs? yes -Can you walk one block? yes -Do you have more than 5 illnesses? no -Have you lost more than 5% of your weight in last 6 months?no   Mentation: -Hx of dementia: no -h/o delirium: no -Cognitive enhancing agents: no   Sleep: well     Matters Most: Will discuss next visit

## 2024-08-26 ENCOUNTER — APPOINTMENT (OUTPATIENT)
Dept: GERIATRICS | Facility: CLINIC | Age: 79
End: 2024-08-26
Payer: MEDICARE

## 2024-08-26 DIAGNOSIS — E78.2 MIXED HYPERLIPIDEMIA: ICD-10-CM

## 2024-08-26 DIAGNOSIS — I25.10 ATHEROSCLEROTIC HEART DISEASE OF NATIVE CORONARY ARTERY W/OUT ANGINA PECTORIS: ICD-10-CM

## 2024-08-26 PROCEDURE — 99443: CPT | Mod: 93

## 2024-08-26 PROCEDURE — G2211 COMPLEX E/M VISIT ADD ON: CPT | Mod: NC

## 2024-08-26 RX ORDER — ROSUVASTATIN CALCIUM 5 MG/1
5 TABLET, FILM COATED ORAL AT BEDTIME
Qty: 90 | Refills: 0 | Status: ACTIVE | COMMUNITY
Start: 2024-08-26 | End: 1900-01-01

## 2024-08-26 NOTE — REASON FOR VISIT
[Follow-Up] : a follow-up visit [Home] : at home, [unfilled] , at the time of the educational consult. [Medical Office: ([facility])___] : at the medical office located in  [Participant] : the participant [Self] : self

## 2024-08-26 NOTE — HISTORY OF PRESENT ILLNESS
[FreeTextEntry1] : Patient is a 78-year-old female with past medical history of hyperlipidemia, osteoporosis, moderate CAD per calcium scoring, possible A-fib followed by cardiology, bipolar disorder on divalproex.  Patient had been on Lipitor from November to 2024 and she noticed her balance getting worse.  She has been working with occupational therapy and since she has been off of Lipitor she feels that there is a visible difference in her balance.  She would like to try another statin to bring down her cholesterol.  She is okay with starting on a lower dose to make sure she does not have the symptoms again.  2024 Patient is a 78-year-old female with past medical history of hyperlipidemia, osteoporosis, moderate CAD per calcium scoring, possible A-fib followed by cardiology, bipolar disorder on divalproex.  Today patient would like to talk about her Lipitor.  After she fell in  she was sent to a SNF and got home physical therapy and Occupational Therapy after which she moved to a new apartment which exhausted her.  Patient states that she has not felt the same since then.  She was started on Lipitor in November and one of her friends said that she had a bad reaction from Lipitor which made her tired and this was switched to rosuvastatin.  Patient would like to know if she can be switched to rosuvastatin as well.  She has no muscle pains.  She is getting occupational therapy for balance.   2024 IRAM YOUNG is a 78-year-old White female, is coming in today to establish care. The patient has PMHx of vitamin D deficiency and osteoporosis.    #Osteoporosis Followed by endocrinology On Prolia, calcium, vitamin D  #Hyperlipidemia Started on Lipitor 40 mg nightly, LDL well-controlled  #CAD as per calcium scoring On aspirin 81 Followed by cardiology  #Possible A-fib Monitored by cardiology  #Bipolar disorder on divalproex since long time. has a psychiatrist.  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   Hospital course is complicated with virus superimposed infection, start IV antibiotics, patient feels better after iv antibiotics.   Patient denies fever, chill, headache, dizziness, hest pain, SOB, abdominal pain, N.V. No diarrhea,no urinary complaints.   CK level trending down. Patient is medically stable to be discharged today.   Discussed with Family.      PT from next week. Out from SNF since tuesday 10 october. Using walker since the fall and chair for shower. has grief counseling every week. Has good [place] of friends. Has activity tomorrow for breast care awareness. Grief regarding death of sister last year and separation from   #Osteoporosis On Prolia, vitamin D, calcium.  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)     Medications: -Anticholinergic burden: no    Mobility:   -Driving- yes; but not driving until feel more confident -Chronic pain: no    Frail Scale: 0/5   -Are you fatigued? improving -Can you walk up one flight of stairs? yes -Can you walk one block? yes -Do you have more than 5 illnesses? no -Have you lost more than 5% of your weight in last 6 months?no   Mentation: -Hx of dementia: no -h/o delirium: no -Cognitive enhancing agents: no   Sleep: well     Matters Most: Will discuss next visit

## 2024-10-21 DIAGNOSIS — Z12.31 ENCOUNTER FOR SCREENING MAMMOGRAM FOR MALIGNANT NEOPLASM OF BREAST: ICD-10-CM

## 2024-10-21 DIAGNOSIS — R92.30 DENSE BREASTS, UNSPECIFIED: ICD-10-CM

## 2024-10-23 ENCOUNTER — RESULT REVIEW (OUTPATIENT)
Age: 79
End: 2024-10-23

## 2024-10-28 ENCOUNTER — APPOINTMENT (OUTPATIENT)
Dept: GERIATRICS | Facility: CLINIC | Age: 79
End: 2024-10-28
Payer: MEDICARE

## 2024-10-28 VITALS
SYSTOLIC BLOOD PRESSURE: 126 MMHG | TEMPERATURE: 96.9 F | HEART RATE: 81 BPM | WEIGHT: 109 LBS | HEIGHT: 60 IN | DIASTOLIC BLOOD PRESSURE: 66 MMHG | BODY MASS INDEX: 21.4 KG/M2 | OXYGEN SATURATION: 97 %

## 2024-10-28 DIAGNOSIS — E78.2 MIXED HYPERLIPIDEMIA: ICD-10-CM

## 2024-10-28 DIAGNOSIS — D64.9 ANEMIA, UNSPECIFIED: ICD-10-CM

## 2024-10-28 DIAGNOSIS — R79.89 OTHER SPECIFIED ABNORMAL FINDINGS OF BLOOD CHEMISTRY: ICD-10-CM

## 2024-10-28 DIAGNOSIS — M81.8 OTHER OSTEOPOROSIS W/OUT CURRENT PATHOLOGICAL FRACTURE: ICD-10-CM

## 2024-10-28 DIAGNOSIS — I25.10 ATHEROSCLEROTIC HEART DISEASE OF NATIVE CORONARY ARTERY W/OUT ANGINA PECTORIS: ICD-10-CM

## 2024-10-28 DIAGNOSIS — F31.60 BIPOLAR DISORDER, CURRENT EPISODE MIXED, UNSPECIFIED: ICD-10-CM

## 2024-10-28 PROCEDURE — G0444 DEPRESSION SCREEN ANNUAL: CPT

## 2024-10-28 PROCEDURE — G0439: CPT

## 2024-10-29 ENCOUNTER — RX RENEWAL (OUTPATIENT)
Age: 79
End: 2024-10-29

## 2024-10-31 LAB
BASOPHILS # BLD AUTO: 0.03 K/UL
BASOPHILS NFR BLD AUTO: 0.4 %
CHOLEST SERPL-MCNC: 179 MG/DL
CRP SERPL HS-MCNC: 0.61 MG/L
EOSINOPHIL # BLD AUTO: 0.03 K/UL
EOSINOPHIL NFR BLD AUTO: 0.4 %
FERRITIN SERPL-MCNC: 44 NG/ML
FOLATE SERPL-MCNC: >20 NG/ML
HCT VFR BLD CALC: 34.9 %
HDLC SERPL-MCNC: 91 MG/DL
HGB BLD-MCNC: 11.2 G/DL
IMM GRANULOCYTES NFR BLD AUTO: 0.1 %
IRON SATN MFR SERPL: 19 %
IRON SERPL-MCNC: 71 UG/DL
LDLC SERPL CALC-MCNC: 80 MG/DL
LYMPHOCYTES # BLD AUTO: 2.5 K/UL
LYMPHOCYTES NFR BLD AUTO: 34.7 %
MAN DIFF?: NORMAL
MCHC RBC-ENTMCNC: 30.6 PG
MCHC RBC-ENTMCNC: 32.1 GM/DL
MCV RBC AUTO: 95.4 FL
MONOCYTES # BLD AUTO: 0.68 K/UL
MONOCYTES NFR BLD AUTO: 9.4 %
NEUTROPHILS # BLD AUTO: 3.95 K/UL
NEUTROPHILS NFR BLD AUTO: 55 %
NONHDLC SERPL-MCNC: 88 MG/DL
PLATELET # BLD AUTO: 212 K/UL
RBC # BLD: 3.66 M/UL
RBC # BLD: 3.66 M/UL
RBC # FLD: 15.1 %
RETICS # AUTO: 0.7 %
RETICS AGGREG/RBC NFR: 26.7 K/UL
TIBC SERPL-MCNC: 378 UG/DL
TRIGL SERPL-MCNC: 39 MG/DL
TSH SERPL-ACNC: 2.76 UIU/ML
UIBC SERPL-MCNC: 308 UG/DL
VALPROATE SERPL-MCNC: 67 UG/ML
VIT B12 SERPL-MCNC: 1080 PG/ML
WBC # FLD AUTO: 7.2 K/UL

## 2024-11-04 LAB — APO LP(A) SERPL-MCNC: 242.7 NMOL/L

## 2024-11-05 LAB
HOMOCYSTEINE LEVEL: 10 UMOL/L
METHYLMALONATE SERPL-SCNC: 174 NMOL/L

## 2024-11-08 ENCOUNTER — APPOINTMENT (OUTPATIENT)
Dept: OBGYN | Facility: CLINIC | Age: 79
End: 2024-11-08
Payer: MEDICARE

## 2024-11-08 DIAGNOSIS — N95.2 POSTMENOPAUSAL ATROPHIC VAGINITIS: ICD-10-CM

## 2024-11-08 PROCEDURE — 99213 OFFICE O/P EST LOW 20 MIN: CPT

## 2024-11-11 ENCOUNTER — APPOINTMENT (OUTPATIENT)
Dept: ENDOCRINOLOGY | Facility: CLINIC | Age: 79
End: 2024-11-11
Payer: MEDICARE

## 2024-11-11 DIAGNOSIS — M81.8 OTHER OSTEOPOROSIS W/OUT CURRENT PATHOLOGICAL FRACTURE: ICD-10-CM

## 2024-11-11 PROCEDURE — 99215 OFFICE O/P EST HI 40 MIN: CPT

## 2024-11-13 ENCOUNTER — APPOINTMENT (OUTPATIENT)
Dept: CARDIOLOGY | Facility: CLINIC | Age: 79
End: 2024-11-13
Payer: MEDICARE

## 2024-11-13 ENCOUNTER — NON-APPOINTMENT (OUTPATIENT)
Age: 79
End: 2024-11-13

## 2024-11-13 VITALS
OXYGEN SATURATION: 99 % | DIASTOLIC BLOOD PRESSURE: 59 MMHG | SYSTOLIC BLOOD PRESSURE: 133 MMHG | BODY MASS INDEX: 21.09 KG/M2 | HEART RATE: 66 BPM | WEIGHT: 108 LBS

## 2024-11-13 DIAGNOSIS — E78.2 MIXED HYPERLIPIDEMIA: ICD-10-CM

## 2024-11-13 DIAGNOSIS — I25.10 ATHEROSCLEROTIC HEART DISEASE OF NATIVE CORONARY ARTERY W/OUT ANGINA PECTORIS: ICD-10-CM

## 2024-11-13 PROCEDURE — 99214 OFFICE O/P EST MOD 30 MIN: CPT

## 2024-11-13 PROCEDURE — 93000 ELECTROCARDIOGRAM COMPLETE: CPT

## 2024-11-13 RX ORDER — ESTRADIOL 10 UG/1
10 TABLET, FILM COATED VAGINAL
Qty: 24 | Refills: 3 | Status: DISCONTINUED | COMMUNITY
Start: 2024-11-08 | End: 2024-11-13

## 2024-11-13 RX ORDER — ESTRADIOL 0.1 MG/G
0.1 CREAM VAGINAL
Qty: 3 | Refills: 3 | Status: ACTIVE | COMMUNITY
Start: 2024-11-13

## 2024-12-02 ENCOUNTER — RESULT REVIEW (OUTPATIENT)
Age: 79
End: 2024-12-02

## 2024-12-09 ENCOUNTER — APPOINTMENT (OUTPATIENT)
Dept: DERMATOLOGY | Facility: CLINIC | Age: 79
End: 2024-12-09
Payer: MEDICARE

## 2024-12-09 VITALS — BODY MASS INDEX: 21.2 KG/M2 | WEIGHT: 108 LBS | HEIGHT: 60 IN

## 2024-12-09 DIAGNOSIS — L82.1 OTHER SEBORRHEIC KERATOSIS: ICD-10-CM

## 2024-12-09 DIAGNOSIS — L82.0 INFLAMED SEBORRHEIC KERATOSIS: ICD-10-CM

## 2024-12-09 DIAGNOSIS — L72.0 EPIDERMAL CYST: ICD-10-CM

## 2024-12-09 DIAGNOSIS — Z85.828 PERSONAL HISTORY OF OTHER MALIGNANT NEOPLASM OF SKIN: ICD-10-CM

## 2024-12-09 DIAGNOSIS — D22.9 MELANOCYTIC NEVI, UNSPECIFIED: ICD-10-CM

## 2024-12-09 PROCEDURE — 99203 OFFICE O/P NEW LOW 30 MIN: CPT | Mod: 25

## 2024-12-09 PROCEDURE — 17110 DESTRUCTION B9 LES UP TO 14: CPT

## 2025-01-15 ENCOUNTER — TRANSCRIPTION ENCOUNTER (OUTPATIENT)
Age: 80
End: 2025-01-15

## 2025-01-29 ENCOUNTER — TRANSCRIPTION ENCOUNTER (OUTPATIENT)
Age: 80
End: 2025-01-29

## 2025-02-04 ENCOUNTER — APPOINTMENT (OUTPATIENT)
Dept: ENDOCRINOLOGY | Facility: CLINIC | Age: 80
End: 2025-02-04